# Patient Record
Sex: FEMALE | Race: WHITE | NOT HISPANIC OR LATINO | Employment: STUDENT | URBAN - METROPOLITAN AREA
[De-identification: names, ages, dates, MRNs, and addresses within clinical notes are randomized per-mention and may not be internally consistent; named-entity substitution may affect disease eponyms.]

---
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
## 2025-04-07 ENCOUNTER — OFFICE VISIT (OUTPATIENT)
Dept: OBGYN CLINIC | Facility: CLINIC | Age: 25
End: 2025-04-07
Payer: COMMERCIAL

## 2025-04-07 VITALS
SYSTOLIC BLOOD PRESSURE: 106 MMHG | WEIGHT: 143 LBS | BODY MASS INDEX: 23.82 KG/M2 | DIASTOLIC BLOOD PRESSURE: 80 MMHG | HEIGHT: 65 IN

## 2025-04-07 DIAGNOSIS — N89.8 VAGINAL DISCHARGE: ICD-10-CM

## 2025-04-07 DIAGNOSIS — Z01.411 ENCOUNTER FOR GYNECOLOGICAL EXAMINATION (GENERAL) (ROUTINE) WITH ABNORMAL FINDINGS: Primary | ICD-10-CM

## 2025-04-07 DIAGNOSIS — R30.0 DYSURIA: ICD-10-CM

## 2025-04-07 DIAGNOSIS — Z12.4 SCREENING FOR CERVICAL CANCER: ICD-10-CM

## 2025-04-07 PROBLEM — G89.29 CHRONIC NONINTRACTABLE HEADACHE: Status: ACTIVE | Noted: 2022-11-01

## 2025-04-07 PROBLEM — R51.9 CHRONIC NONINTRACTABLE HEADACHE: Status: ACTIVE | Noted: 2022-11-01

## 2025-04-07 LAB
SL AMB  POCT GLUCOSE, UA: NORMAL
SL AMB LEUKOCYTE ESTERASE,UA: NORMAL
SL AMB POCT BILIRUBIN,UA: NORMAL
SL AMB POCT BLOOD,UA: NORMAL
SL AMB POCT COLOR,UA: YELLOW
SL AMB POCT KETONES,UA: NORMAL
SL AMB POCT NITRITE,UA: NORMAL
SL AMB POCT PH,UA: 7
SL AMB POCT SPECIFIC GRAVITY,UA: 1.01
SL AMB POCT URINE PROTEIN: NORMAL
SL AMB POCT UROBILINOGEN: NORMAL

## 2025-04-07 PROCEDURE — 87480 CANDIDA DNA DIR PROBE: CPT | Performed by: PHYSICIAN ASSISTANT

## 2025-04-07 PROCEDURE — 87510 GARDNER VAG DNA DIR PROBE: CPT | Performed by: PHYSICIAN ASSISTANT

## 2025-04-07 PROCEDURE — S0610 ANNUAL GYNECOLOGICAL EXAMINA: HCPCS | Performed by: PHYSICIAN ASSISTANT

## 2025-04-07 PROCEDURE — G0145 SCR C/V CYTO,THINLAYER,RESCR: HCPCS | Performed by: PHYSICIAN ASSISTANT

## 2025-04-07 PROCEDURE — 87660 TRICHOMONAS VAGIN DIR PROBE: CPT | Performed by: PHYSICIAN ASSISTANT

## 2025-04-07 PROCEDURE — 81002 URINALYSIS NONAUTO W/O SCOPE: CPT | Performed by: PHYSICIAN ASSISTANT

## 2025-04-07 NOTE — PROGRESS NOTES
Annual Exam Pre-charting    Last Annual Exam:      Last PAP/HPV Test and Result:     Last Mammo Screening:    Last STD Culture Screening:    Current BC Method:

## 2025-04-07 NOTE — PROGRESS NOTES
ASSESSMENT & PLAN: Donny Kemp is a 24 y.o.  with abnormal gynecologic exam.    1.  Routine well woman exam done today  2.  First pap smear performed today  Current ASCCP Guidelines reviewed.   3.  STD testing  offered and declined today.  Insetting of episode of dysuria and urinary frequency last week urine dip performed only trace protein otherwise unremarkable.  There was increased amount of white discharge for which vaginosis probe collected.  Will await test results and contact patient with them and if any follow-up treatment is necessary.  4.  Gardasil vaccine series is recommended.  5. The following were reviewed in today's visit: breast self exam, STD testing, adequate intake of calcium and vitamin D, exercise, healthy diet, and age-appropriate recommendations guarding screenings and prevention.      RTO 1 year annual exam, sooner problems arise in the interim.    CC:  Annual Gynecologic Examination    HPI: Donny Kemp is a 24 y.o.  who presents for annual gynecologic examination.    She is a new patient with our office.     She has the following concerns:  she notes some burning with urination and frequency x 1 day last week. She used OTC and sxs resolved.  She is still concerned as she didn't know what it was, no symptoms since.  Denies any vaginal burning/itching, odor or discharge.  She is sexually active - monogamous with 1 partner x 4 years.     She gets infrequent headaches, no dx of migraines.  She reports otherwise she is healthy.  She does not take any prescription medications    Healthy diet Yes; drinks mainly water  Exercise Yes; pilates  Vitamins Yes; biotin    Patient's last menstrual period was 2025.    Menses frequency: regular, once a month  Length of bleedin-6 days  Bleeding quality:moderate to heavy first 2-3 days then tapers. chronic  Sxs with menses: moderate to severe cramping every few cycles with menses. Takes pamprin for cramping.   Denies irregular  bleeding.       Health Maintenance:      She does not perform regular monthly self breast exams.  Denies localized breast pain, lump, skin change or nipple discharge.      She feels safe at home. Feels safe in relationship with partner.     History reviewed. No pertinent past medical history.    Past Surgical History:   Procedure Laterality Date    WRIST SURGERY Left     tear, ganglion cyst       OB/Gyn History:    Pt does not have menstrual issues.     History of sexually transmitted infection: denies.  History of abnormal pap smears: N/A - never had cervical cancer screening before. .    Patient is currently sexually active.    The current method of family planning is condoms.    OB History          0    Para   0    Term   0       0    AB   0    Living   0         SAB   0    IAB   0    Ectopic   0    Multiple   0    Live Births   0                 Family History   Problem Relation Age of Onset    No Known Problems Mother     Cardiomyopathy Father     Breast cancer Maternal Aunt     Skin cancer Maternal Aunt     Colon cancer Neg Hx     Ovarian cancer Neg Hx        Family history of Breast/Uterine/Ovarian/Colon Cancer: as above    Social History:  Social History     Socioeconomic History    Marital status: Single     Spouse name: Not on file    Number of children: Not on file    Years of education: Not on file    Highest education level: Not on file   Occupational History    Not on file   Tobacco Use    Smoking status: Passive Smoke Exposure - Never Smoker    Smokeless tobacco: Not on file   Vaping Use    Vaping status: Never Used   Substance and Sexual Activity    Alcohol use: Yes    Drug use: Never    Sexual activity: Yes     Partners: Male   Other Topics Concern    Not on file   Social History Narrative    Not on file     Social Drivers of Health     Financial Resource Strain: Low Risk  (10/31/2022)    Received from [facility]    Overall Financial Resource Strain (CARDIA)      Difficulty of Paying Living Expenses: Not very hard   Food Insecurity: No Food Insecurity (10/31/2022)    Received from [facility]    Hunger Vital Sign     Worried About Running Out of Food in the Last Year: Never true     Ran Out of Food in the Last Year: Never true   Transportation Needs: No Transportation Needs (10/31/2022)    Received from [facility]    PRAPARE - Transportation     Lack of Transportation (Medical): No     Lack of Transportation (Non-Medical): No   Physical Activity: Not on file   Stress: Not on file   Social Connections: Moderately Isolated (10/31/2022)    Received from [facility]    Social Connection and Isolation Panel [NHANES]     Frequency of Communication with Friends and Family: Twice a week     Frequency of Social Gatherings with Friends and Family: More than three times a week     Attends [employer] Services: 1 to 4 times per year     Active Member of Clubs or Organizations: No     Attends Club or Organization Meetings: Never     Marital Status: Never    Intimate Partner Violence: Not on file   Housing Stability: Low Risk  (10/31/2022)    Received from [facility]    Housing Stability Vital Sign     Unable to Pay for Housing in the Last Year: No     Number of Places Lived in the Last Year: 2     Unstable Housing in the Last Year: No         Allergies   Allergen Reactions    Amoxicillin-Pot Clavulanate Hives       No current outpatient medications on file.    Review of Systems:  Constitutional :no fever, feels well, no tiredness, no recent weight gain or loss  ENT: no ear ache, no loss of hearing, no nosebleeds or nasal discharge, no sore throat or hoarseness.  Cardiovascular: no complaints of slow or fast heart beat, no chest pain, no palpitations, no leg claudication or lower extremity edema.  Respiratory: no complaints of shortness of shortness of breath, no PEREZ  Breasts:no complaints of breast pain, breast  "lump, or nipple discharge  Gastrointestinal: no complaints of abdominal pain, constipation, nausea, vomiting, or diarrhea or bloody stools  Genitourinary : dysuria and frequency of urination x 1 day last week, sxs resolved since. no complaints of  incontinence, pelvic pain, no worsening dysmenorrhea, vaginal discharge/itching/odor or abnormal vaginal bleeding.  Musculoskeletal: no complaints of arthralgia, no myalgia, no joint swelling or stiffness, no limb pain or swelling.  Integumentary: no complaints of skin rash or lesion, itching or dry skin  Neurological: occasional HA's. no confusion, no numbness or tingling, no dizziness or fainting  Mental Health: no anxiety, depression, SI    Objective      /80 (BP Location: Left arm, Patient Position: Sitting, Cuff Size: Adult)   Ht 5' 5\" (1.651 m)   Wt 64.9 kg (143 lb)   LMP 03/25/2025   BMI 23.80 kg/m²     General:   appears stated age, cooperative, alert normal mood and affect.  BMI 23.8   Neck: normal, supple,trachea midline, no masses   Heart: regular rate and rhythm, S1, S2 normal, no murmur, click, rub or gallop   Lungs: clear to auscultation bilaterally   Breasts: normal appearance, no masses or tenderness, No nipple retraction or dimpling, No nipple discharge or bleeding, No axillary or supraclavicular adenopathy, Normal to palpation without dominant masses   Abdomen: soft, non-tender, without masses or organomegaly   Vulva: normal female genitalia, no lesions   Vagina: Increased amount of white discharge without appreciable odor, abnormal bleeding, vaginal erythema or visible lesion.    Urethra: normal   Cervix: PAP done. Nontender.   Uterus: normal size, contour, position, consistency, mobility, non-tender   Adnexa: no mass, fullness, tenderness   Lymphatic palpation of lymph nodes in neck, axilla, groin and/or other locations: no lymphadenopathy or masses noted   Skin normal skin turgor and no rashes.   Psychiatric orientation to person, place, and " time: normal. mood and affect: normal

## 2025-04-08 ENCOUNTER — RESULTS FOLLOW-UP (OUTPATIENT)
Dept: OBGYN CLINIC | Facility: CLINIC | Age: 25
End: 2025-04-08

## 2025-04-08 DIAGNOSIS — B96.89 BV (BACTERIAL VAGINOSIS): Primary | ICD-10-CM

## 2025-04-08 DIAGNOSIS — N76.0 BV (BACTERIAL VAGINOSIS): Primary | ICD-10-CM

## 2025-04-08 LAB
CANDIDA RRNA VAG QL PROBE: NOT DETECTED
G VAGINALIS RRNA GENITAL QL PROBE: DETECTED
T VAGINALIS RRNA GENITAL QL PROBE: NOT DETECTED

## 2025-04-08 RX ORDER — METRONIDAZOLE 500 MG/1
500 TABLET ORAL EVERY 12 HOURS SCHEDULED
Qty: 14 TABLET | Refills: 0 | Status: SHIPPED | OUTPATIENT
Start: 2025-04-08 | End: 2025-04-15

## 2025-04-11 LAB
LAB AP GYN PRIMARY INTERPRETATION: NORMAL
Lab: NORMAL